# Patient Record
Sex: FEMALE | Race: WHITE | NOT HISPANIC OR LATINO | URBAN - METROPOLITAN AREA
[De-identification: names, ages, dates, MRNs, and addresses within clinical notes are randomized per-mention and may not be internally consistent; named-entity substitution may affect disease eponyms.]

---

## 2022-08-12 ENCOUNTER — OFFICE VISIT (OUTPATIENT)
Dept: INTERNAL MEDICINE | Facility: CLINIC | Age: 32
End: 2022-08-12

## 2022-08-12 VITALS
WEIGHT: 112.6 LBS | HEART RATE: 78 BPM | HEIGHT: 62 IN | BODY MASS INDEX: 20.72 KG/M2 | TEMPERATURE: 98 F | DIASTOLIC BLOOD PRESSURE: 60 MMHG | SYSTOLIC BLOOD PRESSURE: 90 MMHG | OXYGEN SATURATION: 100 %

## 2022-08-12 DIAGNOSIS — Z00.00 HEALTHCARE MAINTENANCE: ICD-10-CM

## 2022-08-12 DIAGNOSIS — F33.41 RECURRENT MAJOR DEPRESSIVE DISORDER, IN PARTIAL REMISSION: Primary | ICD-10-CM

## 2022-08-12 DIAGNOSIS — G44.209 TENSION HEADACHE: ICD-10-CM

## 2022-08-12 DIAGNOSIS — R53.83 OTHER FATIGUE: ICD-10-CM

## 2022-08-12 DIAGNOSIS — H91.93 DECREASED HEARING OF BOTH EARS: ICD-10-CM

## 2022-08-12 DIAGNOSIS — Z78.9 USES BIRTH CONTROL: ICD-10-CM

## 2022-08-12 PROBLEM — F34.1 DYSTHYMIA: Status: ACTIVE | Noted: 2022-08-12

## 2022-08-12 PROCEDURE — 99204 OFFICE O/P NEW MOD 45 MIN: CPT | Performed by: INTERNAL MEDICINE

## 2022-08-12 RX ORDER — NORGESTIMATE AND ETHINYL ESTRADIOL 7DAYSX3 28
1 KIT ORAL DAILY
Qty: 84 TABLET | Refills: 3 | Status: SHIPPED | OUTPATIENT
Start: 2022-08-12 | End: 2023-08-12

## 2022-08-12 RX ORDER — NORGESTIMATE AND ETHINYL ESTRADIOL 7DAYSX3 28
1 KIT ORAL DAILY
COMMUNITY
End: 2022-08-12 | Stop reason: SDUPTHER

## 2022-08-12 NOTE — ASSESSMENT & PLAN NOTE
- Seeing therapist since May   - Never been on depression medication  - Never had SI or other self harm  - Very on the fence about medication.  Will look through medication and consider

## 2022-08-12 NOTE — ASSESSMENT & PLAN NOTE
- Noting a lot of napping and fatigue since being here  - Mostly feels refreshed in the morning   - Could be related to depression   - FHx in Dad of Graves  - Will order TSH and free T4

## 2022-08-12 NOTE — ASSESSMENT & PLAN NOTE
- We will plan to do pap smear at follow up visit in 3 months and check in on depression  - Has had all COVID vaccines and booster, will upload via [employer]  - Likely had TDAP with last pregnancy 4 years ago, but will check vaccine card at home and upload

## 2022-08-12 NOTE — PATIENT INSTRUCTIONS
We will do a pap smear in 3 months and check in on depression.  Keep up the good work caring for yourself and upload your vaccines when you have a chance.

## 2022-08-12 NOTE — PROGRESS NOTES
"Chief Complaint  Establish Care    Subjective        Marly Seaman presents to [facility]  History of Present Illness   Ms. Marly Seaman is a alex 30 yo F with PMHx of ovarian cysts, depression in partial remission, and fatigue who presents to establish care.     Objective   Vital Signs:  BP 90/60 (BP Location: Left arm, Patient Position: Sitting)   Pulse 78   Temp 98 °F (36.7 °C)   Ht 157.5 cm (62\")   Wt 51.1 kg (112 lb 9.6 oz)   SpO2 100%   BMI 20.59 kg/m²   Estimated body mass index is 20.59 kg/m² as calculated from the following:    Height as of this encounter: 157.5 cm (62\").    Weight as of this encounter: 51.1 kg (112 lb 9.6 oz).    BMI is within normal parameters. No other follow-up for BMI required.      Physical Exam  Vitals reviewed.   Constitutional:       General: She is not in acute distress.     Appearance: Normal appearance.   HENT:      Head: Normocephalic and atraumatic.      Nose: Nose normal.      Mouth/Throat:      Mouth: Mucous membranes are moist.   Eyes:      Conjunctiva/sclera: Conjunctivae normal.   Cardiovascular:      Rate and Rhythm: Normal rate and regular rhythm.   Pulmonary:      Effort: Pulmonary effort is normal.      Breath sounds: Normal breath sounds.   Abdominal:      Palpations: Abdomen is soft.      Tenderness: There is no abdominal tenderness.   Skin:     General: Skin is warm and dry.   Neurological:      General: No focal deficit present.      Mental Status: She is alert.   Psychiatric:         Mood and Affect: Mood normal.        Result Review :  No results available for review         Assessment and Plan   Diagnoses and all orders for this visit:    1. Recurrent major depressive disorder, in partial remission (HCC) (Primary)  Assessment & Plan:  - Seeing therapist since May   - Never been on depression medication  - Never had SI or other self harm  - Very on the fence about medication.  Will look through medication and consider "       2. Other fatigue  Assessment & Plan:  - Noting a lot of napping and fatigue since being here  - Mostly feels refreshed in the morning   - Could be related to depression   - FHx in Dad of Graves  - Will order TSH and free T4    Orders:  -     TSH  -     T4, free  -     CBC w AUTO Differential    3. Decreased hearing of both ears  Assessment & Plan:  - Concerned about decreased hearing     Orders:  -     Ambulatory Referral to Audiology    4. Tension headache  Assessment & Plan:  - Ibuprofen and Excedrin migraine are effective at this point         5. Uses birth control  -     norgestimate-ethinyl estradiol (Tri-Sprintec) 0.18/0.215/0.25 MG-35 MCG per tablet; Take 1 tablet by mouth Daily.  Dispense: 84 tablet; Refill: 3    6. Healthcare maintenance  Assessment & Plan:  - We will plan to do pap smear at follow up visit in 3 months and check in on depression  - Has had all COVID vaccines and booster, will upload via [employer]  - Likely had TDAP with last pregnancy 4 years ago, but will check vaccine card at home and upload     Orders:  -     Hepatitis C antibody  -     HIV-1/O/2 Ag/Ab w Reflex           Follow Up   Return in about 3 months (around 11/12/2022) for Pap smear .  Patient was given instructions and counseling regarding her condition or for health maintenance advice. Please see specific information pulled into the AVS if appropriate.

## 2022-08-13 LAB
BASOPHILS # BLD AUTO: 0 X10E3/UL (ref 0–0.2)
BASOPHILS NFR BLD AUTO: 1 %
EOSINOPHIL # BLD AUTO: 0.1 X10E3/UL (ref 0–0.4)
EOSINOPHIL NFR BLD AUTO: 1 %
ERYTHROCYTE [DISTWIDTH] IN BLOOD BY AUTOMATED COUNT: 12.6 % (ref 11.7–15.4)
HCT VFR BLD AUTO: 37.1 % (ref 34–46.6)
HCV AB S/CO SERPL IA: 0.1 S/CO RATIO (ref 0–0.9)
HGB BLD-MCNC: 11.8 G/DL (ref 11.1–15.9)
HIV 1+2 AB+HIV1 P24 AG SERPL QL IA: NON REACTIVE
IMM GRANULOCYTES # BLD AUTO: 0 X10E3/UL (ref 0–0.1)
IMM GRANULOCYTES NFR BLD AUTO: 0 %
LYMPHOCYTES # BLD AUTO: 1.4 X10E3/UL (ref 0.7–3.1)
LYMPHOCYTES NFR BLD AUTO: 41 %
MCH RBC QN AUTO: 28.2 PG (ref 26.6–33)
MCHC RBC AUTO-ENTMCNC: 31.8 G/DL (ref 31.5–35.7)
MCV RBC AUTO: 89 FL (ref 79–97)
MONOCYTES # BLD AUTO: 0.3 X10E3/UL (ref 0.1–0.9)
MONOCYTES NFR BLD AUTO: 7 %
NEUTROPHILS # BLD AUTO: 1.7 X10E3/UL (ref 1.4–7)
NEUTROPHILS NFR BLD AUTO: 50 %
PLATELET # BLD AUTO: 159 X10E3/UL (ref 150–450)
RBC # BLD AUTO: 4.18 X10E6/UL (ref 3.77–5.28)
T4 FREE SERPL-MCNC: 1.01 NG/DL (ref 0.82–1.77)
TSH SERPL DL<=0.005 MIU/L-ACNC: 1.74 UIU/ML (ref 0.45–4.5)
WBC # BLD AUTO: 3.5 X10E3/UL (ref 3.4–10.8)

## 2022-08-16 ENCOUNTER — TELEPHONE (OUTPATIENT)
Dept: INTERNAL MEDICINE | Facility: CLINIC | Age: 32
End: 2022-08-16

## 2022-08-16 NOTE — TELEPHONE ENCOUNTER
Doing well.  Reviewed labs that all look good.  Hemoglobin is 11.8, borderline low and she has had some fatigue.  Will do iron studies in 3 months if still having fatigue at that time.

## 2024-01-25 ENCOUNTER — TELEMEDICINE (OUTPATIENT)
Dept: INTERNAL MEDICINE | Facility: CLINIC | Age: 34
End: 2024-01-25
Payer: COMMERCIAL

## 2024-01-25 VITALS — HEIGHT: 62 IN | WEIGHT: 112 LBS | BODY MASS INDEX: 20.61 KG/M2

## 2024-01-25 DIAGNOSIS — L03.213 PRESEPTAL CELLULITIS OF LEFT UPPER EYELID: Primary | ICD-10-CM

## 2024-01-25 PROCEDURE — 99213 OFFICE O/P EST LOW 20 MIN: CPT | Performed by: INTERNAL MEDICINE

## 2024-01-25 RX ORDER — DIPHENOXYLATE HYDROCHLORIDE AND ATROPINE SULFATE 2.5; .025 MG/1; MG/1
TABLET ORAL DAILY
COMMUNITY

## 2024-01-25 RX ORDER — MELATONIN
2000 DAILY
COMMUNITY

## 2024-01-25 RX ORDER — AMOXICILLIN AND CLAVULANATE POTASSIUM 875; 125 MG/1; MG/1
1 TABLET, FILM COATED ORAL 2 TIMES DAILY
Qty: 20 TABLET | Refills: 0 | Status: SHIPPED | OUTPATIENT
Start: 2024-01-25 | End: 2024-02-04

## 2024-01-25 NOTE — PROGRESS NOTES
"      Marly Seaman is a 33 y.o. female who presents with a chief complaint of   Chief Complaint   Patient presents with    Covid-19 Home Monitoring Video Visit     TESTED POSITIVE FOR COVID ON 1/19/24    Conjunctivitis     Thinks she might have pink eye       HPI     Eye has been inflamed, itchy, red, and swollen since Tuesday.     The following portions of the patient's history were reviewed and updated as appropriate: allergies, current medications, past family history, past medical history, past social history, past surgical history and problem list.      Current Outpatient Medications:     cholecalciferol (Vitamin D, Cholecalciferol,) 25 MCG (1000 UT) tablet, Take 2 tablets by mouth Daily., Disp: , Rfl:     multivitamin (MULTIVITAMIN PO), Take  by mouth Daily., Disp: , Rfl:     norgestimate-ethinyl estradiol (Tri-Sprintec) 0.18/0.215/0.25 MG-35 MCG per tablet, Take 1 tablet by mouth Daily., Disp: 84 tablet, Rfl: 3    amoxicillin-clavulanate (AUGMENTIN) 875-125 MG per tablet, Take 1 tablet by mouth 2 (Two) Times a Day for 10 days., Disp: 20 tablet, Rfl: 0            Physical Exam  Ht 157.5 cm (62\")   Wt 50.8 kg (112 lb)   BMI 20.49 kg/m²     This was a telehealth visit with audio and visual services.  The visit lasted 10 minutes.  I was present in my office at [facility] and the patient was present at their home.       Results for orders placed or performed in visit on 08/12/22   TSH    Specimen: Blood   Result Value Ref Range    TSH 1.740 0.450 - 4.500 uIU/mL   T4, free    Specimen: Blood   Result Value Ref Range    Free T4 1.01 0.82 - 1.77 ng/dL   Hepatitis C antibody    Specimen: Blood   Result Value Ref Range    Hep C Virus Ab 0.1 0.0 - 0.9 s/co ratio   HIV-1/O/2 Ag/Ab w Reflex    Specimen: Blood   Result Value Ref Range    HIV Screen 4th Gen w/RFX (Reference) Non Reactive Non Reactive   CBC w AUTO Differential    Specimen: Blood   Result Value Ref Range    WBC 3.5 3.4 - 10.8 x10E3/uL    RBC 4.18 3.77 - " 5.28 x10E6/uL    Hemoglobin 11.8 11.1 - 15.9 g/dL    Hematocrit 37.1 34.0 - 46.6 %    MCV 89 79 - 97 fL    MCH 28.2 26.6 - 33.0 pg    MCHC 31.8 31.5 - 35.7 g/dL    RDW 12.6 11.7 - 15.4 %    Platelets 159 150 - 450 x10E3/uL    Neutrophil Rel % 50 Not Estab. %    Lymphocyte Rel % 41 Not Estab. %    Monocyte Rel % 7 Not Estab. %    Eosinophil Rel % 1 Not Estab. %    Basophil Rel % 1 Not Estab. %    Neutrophils Absolute 1.7 1.4 - 7.0 x10E3/uL    Lymphocytes Absolute 1.4 0.7 - 3.1 x10E3/uL    Monocytes Absolute 0.3 0.1 - 0.9 x10E3/uL    Eosinophils Absolute 0.1 0.0 - 0.4 x10E3/uL    Basophils Absolute 0.0 0.0 - 0.2 x10E3/uL    Immature Granulocyte Rel % 0 Not Estab. %    Immature Grans Absolute 0.0 0.0 - 0.1 x10E3/uL           Diagnoses and all orders for this visit:    1. Preseptal cellulitis of left upper eyelid (Primary)  -     amoxicillin-clavulanate (AUGMENTIN) 875-125 MG per tablet; Take 1 tablet by mouth 2 (Two) Times a Day for 10 days.  Dispense: 20 tablet; Refill: 0      Discussed signs of orbital cellulitis.  Discussed importance of antibiotic treatment and answered questions.

## 2024-09-10 ENCOUNTER — OFFICE VISIT (OUTPATIENT)
Dept: INTERNAL MEDICINE | Facility: CLINIC | Age: 34
End: 2024-09-10
Payer: COMMERCIAL

## 2024-09-10 VITALS
SYSTOLIC BLOOD PRESSURE: 110 MMHG | TEMPERATURE: 98.4 F | DIASTOLIC BLOOD PRESSURE: 60 MMHG | HEART RATE: 75 BPM | OXYGEN SATURATION: 97 % | BODY MASS INDEX: 21.22 KG/M2 | WEIGHT: 116 LBS

## 2024-09-10 DIAGNOSIS — Z78.9 USES BIRTH CONTROL: ICD-10-CM

## 2024-09-10 DIAGNOSIS — F33.41 RECURRENT MAJOR DEPRESSIVE DISORDER, IN PARTIAL REMISSION: Primary | ICD-10-CM

## 2024-09-10 PROCEDURE — 99214 OFFICE O/P EST MOD 30 MIN: CPT | Performed by: INTERNAL MEDICINE

## 2024-09-10 RX ORDER — NORGESTIMATE AND ETHINYL ESTRADIOL 7DAYSX3 28
1 KIT ORAL DAILY
Qty: 84 TABLET | Refills: 3 | Status: SHIPPED | OUTPATIENT
Start: 2024-09-10 | End: 2025-09-10

## 2024-09-10 RX ORDER — BUPROPION HYDROCHLORIDE 150 MG/1
150 TABLET ORAL DAILY
Qty: 90 TABLET | Refills: 1 | Status: SHIPPED | OUTPATIENT
Start: 2024-09-10

## 2024-12-11 ENCOUNTER — OFFICE VISIT (OUTPATIENT)
Dept: INTERNAL MEDICINE | Facility: CLINIC | Age: 34
End: 2024-12-11
Payer: COMMERCIAL

## 2024-12-11 VITALS
BODY MASS INDEX: 20.49 KG/M2 | SYSTOLIC BLOOD PRESSURE: 110 MMHG | HEART RATE: 80 BPM | TEMPERATURE: 98.2 F | DIASTOLIC BLOOD PRESSURE: 60 MMHG | OXYGEN SATURATION: 99 % | WEIGHT: 112 LBS

## 2024-12-11 DIAGNOSIS — F33.1 MODERATE EPISODE OF RECURRENT MAJOR DEPRESSIVE DISORDER: ICD-10-CM

## 2024-12-11 DIAGNOSIS — Z00.00 ANNUAL PHYSICAL EXAM: Primary | ICD-10-CM

## 2024-12-11 DIAGNOSIS — R10.2 PELVIC PAIN: ICD-10-CM

## 2024-12-11 PROCEDURE — 99395 PREV VISIT EST AGE 18-39: CPT | Performed by: INTERNAL MEDICINE

## 2024-12-11 RX ORDER — NORGESTIMATE AND ETHINYL ESTRADIOL 0.25-0.035
1 KIT ORAL DAILY
Qty: 84 TABLET | Refills: 3 | Status: SHIPPED | OUTPATIENT
Start: 2024-12-11

## 2024-12-11 NOTE — PROGRESS NOTES
"Chief Complaint  Annual Exam    Subjective        Marly Seaman presents to [facility]  History of Present Illness    Several AE from Wellbutrin with brain fog and possible sexual dysfunction, having bad dreams.     Does have strong Fhx of bipolar disorder.       Objective   Vital Signs:  /60 (BP Location: Right arm, Patient Position: Sitting, Cuff Size: Adult)   Pulse 80   Temp 98.2 °F (36.8 °C) (Infrared)   Wt 50.8 kg (112 lb)   SpO2 99%   BMI 20.49 kg/m²   Estimated body mass index is 20.49 kg/m² as calculated from the following:    Height as of 1/25/24: 157.5 cm (62\").    Weight as of this encounter: 50.8 kg (112 lb).    BMI is within normal parameters. No other follow-up for BMI required.      Physical Exam  Vitals reviewed.   Constitutional:       General: She is not in acute distress.     Appearance: Normal appearance.   HENT:      Head: Normocephalic and atraumatic.      Nose: Nose normal.      Mouth/Throat:      Mouth: Mucous membranes are moist.   Eyes:      Conjunctiva/sclera: Conjunctivae normal.   Cardiovascular:      Rate and Rhythm: Normal rate and regular rhythm.      Pulses: Normal pulses.      Heart sounds: Normal heart sounds.   Pulmonary:      Effort: Pulmonary effort is normal.      Breath sounds: Normal breath sounds.   Abdominal:      Palpations: Abdomen is soft.      Tenderness: There is no abdominal tenderness.   Musculoskeletal:      Right lower leg: No edema.      Left lower leg: No edema.   Skin:     General: Skin is warm and dry.   Neurological:      General: No focal deficit present.      Mental Status: She is alert.   Psychiatric:         Mood and Affect: Mood normal.        Result Review :  The following data was reviewed by: Ashly Read MD on 12/11/2024:    Data reviewed : reviewed [facility] note           Assessment and Plan   Diagnoses and all orders for this visit:    1. Annual physical exam (Primary)    2. Moderate episode of recurrent major " depressive disorder  -     norgestimate-ethinyl estradiol (ORTHO-CYCLEN) 0.25-35 MG-MCG per tablet; Take 1 tablet by mouth Daily.  Dispense: 84 tablet; Refill: 3  -     Cariprazine HCl (VRAYLAR) 1.5 MG capsule capsule; Take 1 capsule by mouth Every Other Day.  Dispense: 14 capsule; Refill: 0    3. Pelvic pain  -     Ambulatory Referral to Gynecology      Now having more pelvic pain that is consistently occurring whereas previously it was much more intermittent.  Discussed transvaginal ultrasound option, naproxen.      Wean off Wellbutrin over 1-2 week time period.  Use this every other day for 1-2 weeks and then stop completely.     Wellbutrin, Fhx of bipolar, therapy for 2.5 years         Follow Up   Return in about 6 weeks (around 1/22/2025) for f/u Vraylar initiation.  Patient was given instructions and counseling regarding her condition or for health maintenance advice. Please see specific information pulled into the AVS if appropriate.

## 2025-01-22 ENCOUNTER — OFFICE VISIT (OUTPATIENT)
Dept: INTERNAL MEDICINE | Facility: CLINIC | Age: 35
End: 2025-01-22
Payer: COMMERCIAL

## 2025-01-22 VITALS
HEART RATE: 77 BPM | BODY MASS INDEX: 21.58 KG/M2 | TEMPERATURE: 98.4 F | SYSTOLIC BLOOD PRESSURE: 106 MMHG | OXYGEN SATURATION: 98 % | WEIGHT: 118 LBS | DIASTOLIC BLOOD PRESSURE: 70 MMHG

## 2025-01-22 DIAGNOSIS — F43.10 PTSD (POST-TRAUMATIC STRESS DISORDER): ICD-10-CM

## 2025-01-22 DIAGNOSIS — F33.41 RECURRENT MAJOR DEPRESSIVE DISORDER, IN PARTIAL REMISSION: Primary | ICD-10-CM

## 2025-01-22 PROCEDURE — 99214 OFFICE O/P EST MOD 30 MIN: CPT | Performed by: INTERNAL MEDICINE

## 2025-01-22 NOTE — PROGRESS NOTES
"Chief Complaint  Depression (6 week f/u )    Subjective        Marly Seaman presents to [facility]  History of Present Illness    Answers submitted by the patient for this visit:  Primary Reason for Visit (Submitted on 1/20/2025)  What is the primary reason for your visit?: Depression    Letter needs to state that she will need additional time for testing.  She would great benefit from time and a half for test.      She has depressive and anxious symptoms that she is managing in multiple .  Heightened intensity.  Looking to mitigate symptoms.  Having more time would help manage.     Objective   Vital Signs:  /70 (BP Location: Left arm, Patient Position: Sitting, Cuff Size: Adult)   Pulse 77   Temp 98.4 °F (36.9 °C) (Infrared)   Wt 53.5 kg (118 lb)   SpO2 98%   BMI 21.58 kg/m²   Estimated body mass index is 21.58 kg/m² as calculated from the following:    Height as of 1/25/24: 157.5 cm (62\").    Weight as of this encounter: 53.5 kg (118 lb).    BMI is within normal parameters. No other follow-up for BMI required.      Physical Exam  Vitals reviewed.   Constitutional:       General: She is not in acute distress.     Appearance: Normal appearance.   HENT:      Head: Normocephalic and atraumatic.      Nose: Nose normal.      Mouth/Throat:      Mouth: Mucous membranes are moist.   Eyes:      Conjunctiva/sclera: Conjunctivae normal.   Pulmonary:      Effort: Pulmonary effort is normal.   Skin:     General: Skin is warm and dry.   Neurological:      General: No focal deficit present.      Mental Status: She is alert.   Psychiatric:         Mood and Affect: Mood normal.        Result Review :  The following data was reviewed by: Ashly Read MD on 01/22/2025:           Assessment and Plan   Diagnoses and all orders for this visit:    1. Recurrent major depressive disorder, in partial remission (Primary)    2. PTSD (post-traumatic stress disorder)      Discussed complexities of " trying to find medication that works well with each individual's body.  She would like to pause on trying new medications for now.  Realizing that she has PTSD and depression has been eye opening for her and she is continuing to explore this in therapy.          Follow Up     Patient was given instructions and counseling regarding her condition or for health maintenance advice. Please see specific information pulled into the AVS if appropriate.